# Patient Record
Sex: MALE | Race: WHITE | NOT HISPANIC OR LATINO | URBAN - METROPOLITAN AREA
[De-identification: names, ages, dates, MRNs, and addresses within clinical notes are randomized per-mention and may not be internally consistent; named-entity substitution may affect disease eponyms.]

---

## 2024-09-23 ENCOUNTER — TELEPHONE (OUTPATIENT)
Dept: OPHTHALMOLOGY | Facility: CLINIC | Age: 69
End: 2024-09-23

## 2024-09-23 NOTE — TELEPHONE ENCOUNTER
Called and spoke to Geoff Delgado is looking for a provider who can do [place] glasses / lens     Mulugeta said it would be ok to schedule with either one of you.     Geoff requested I ask the providers.     Thanks,     Zee Hayes Communication Facilitator on 9/23/2024 at 1:38 PM

## 2024-09-23 NOTE — TELEPHONE ENCOUNTER
I would recommend working with Dr. Alvino Cerna or Dr. Simeon in 60 minute Low Vision Time slot     Anju     New low vision    Will forward to patient communicator to assist in offering appointment.    Mulugeta Shelton RN 11:07 AM 09/23/24

## 2024-09-23 NOTE — TELEPHONE ENCOUNTER
[facility] Call Center    Phone Message    May a detailed message be left on voicemail: yes     Reason for Call: Other: Patient called requesting a call back wondering if there is a provider that is trained to do measurements for [place]. Patient can be reached at [phone].     Action Taken: Other: eye    Travel Screening: Not Applicable

## 2024-09-24 NOTE — TELEPHONE ENCOUNTER
I don't believe we have the kit required for measuring this, but would be available to discuss options at least.     Called Geoff and relayed above -     He will call me back if he would like to schedule or has questions     Zee Hayes Communication Facilitator on 9/24/2024 at 3:41 PM